# Patient Record
Sex: FEMALE | Race: WHITE | NOT HISPANIC OR LATINO | URBAN - METROPOLITAN AREA
[De-identification: names, ages, dates, MRNs, and addresses within clinical notes are randomized per-mention and may not be internally consistent; named-entity substitution may affect disease eponyms.]

---

## 2017-08-22 ENCOUNTER — APPOINTMENT (RX ONLY)
Dept: URBAN - METROPOLITAN AREA CLINIC 134 | Facility: CLINIC | Age: 32
Setting detail: DERMATOLOGY
End: 2017-08-22

## 2017-08-22 DIAGNOSIS — D49.2 NEOPLASM OF UNSPECIFIED BEHAVIOR OF BONE, SOFT TISSUE, AND SKIN: ICD-10-CM

## 2017-08-22 DIAGNOSIS — D22 MELANOCYTIC NEVI: ICD-10-CM

## 2017-08-22 DIAGNOSIS — L81.2 FRECKLES: ICD-10-CM

## 2017-08-22 DIAGNOSIS — Z71.89 OTHER SPECIFIED COUNSELING: ICD-10-CM

## 2017-08-22 PROBLEM — D22.5 MELANOCYTIC NEVI OF TRUNK: Status: ACTIVE | Noted: 2017-08-22

## 2017-08-22 PROBLEM — D22.72 MELANOCYTIC NEVI OF LEFT LOWER LIMB, INCLUDING HIP: Status: ACTIVE | Noted: 2017-08-22

## 2017-08-22 PROBLEM — D22.71 MELANOCYTIC NEVI OF RIGHT LOWER LIMB, INCLUDING HIP: Status: ACTIVE | Noted: 2017-08-22

## 2017-08-22 PROCEDURE — ? SHAVE REMOVAL

## 2017-08-22 PROCEDURE — 11301 SHAVE SKIN LESION 0.6-1.0 CM: CPT

## 2017-08-22 PROCEDURE — ? PUNCH EXCISION

## 2017-08-22 PROCEDURE — ? COUNSELING

## 2017-08-22 PROCEDURE — ? OBSERVATION

## 2017-08-22 PROCEDURE — 99213 OFFICE O/P EST LOW 20 MIN: CPT | Mod: 25

## 2017-08-22 PROCEDURE — 11400 EXC TR-EXT B9+MARG 0.5 CM<: CPT

## 2017-08-22 ASSESSMENT — LOCATION DETAILED DESCRIPTION DERM
LOCATION DETAILED: LEFT MID-UPPER BACK
LOCATION DETAILED: RIGHT SUPERIOR MEDIAL MIDBACK
LOCATION DETAILED: SUPERIOR LUMBAR SPINE
LOCATION DETAILED: LEFT DISTAL POSTERIOR THIGH
LOCATION DETAILED: RIGHT PROXIMAL PRETIBIAL REGION

## 2017-08-22 ASSESSMENT — LOCATION ZONE DERM
LOCATION ZONE: LEG
LOCATION ZONE: TRUNK

## 2017-08-22 ASSESSMENT — LOCATION SIMPLE DESCRIPTION DERM
LOCATION SIMPLE: RIGHT PRETIBIAL REGION
LOCATION SIMPLE: LEFT POSTERIOR THIGH
LOCATION SIMPLE: LOWER BACK
LOCATION SIMPLE: LEFT UPPER BACK
LOCATION SIMPLE: RIGHT LOWER BACK

## 2017-08-22 NOTE — PROCEDURE: OBSERVATION
Size Of Lesion In Cm (Optional): 0
Triangulation (Location Of Lesion In Relation To Distance From Anatomical Landmarks): See photo
Detail Level: Simple

## 2017-08-22 NOTE — PROCEDURE: PUNCH EXCISION
Include Undermining Statement In The Repair Note?: No
Render Post-Care Instructions In Note?: yes
Hemostasis: None
X Depth Of Punch In Cm (Optional): 0
Wound Dressings: a pressure dressing
3.5 Mm Punch Excision Text: A 3.5 mm punch biopsy was used to excise the lesion to the level of the subcutaneous fat.  Blunt dissection was used to free the lesion from the surrounding tissues and the lesion was removed.
1.5 Mm Punch Excision Text: A 1.5 mm punch biopsy was used to excise the lesion to the level of the subcutaneous fat.  Blunt dissection was used to free the lesion from the surrounding tissues and the lesion was removed.
Detail Level: Detailed
Deep Sutures: 4-0 Monocryl
Punch Size In Mm: 10
Suture Removal: 14 days
2.5 Mm Punch Excision Text: A 2.5 mm punch biopsy was used to excise the lesion to the level of the subcutaneous fat.  Blunt dissection was used to free the lesion from the surrounding tissues and the lesion was removed.
2 Mm Punch Excision Text: A 2 mm punch biopsy was used to excise the lesion to the level of the subcutaneous fat.  Blunt dissection was used to free the lesion from the surrounding tissues and the lesion was removed.
Body Location Override (Optional - Billing Will Still Be Based On Selected Body Map Location If Applicable): right shin
4.5 Mm Punch Excision Text: A 4.5 mm punch biopsy was used to excise the lesion to the level of the subcutaneous fat.  Blunt dissection was used to free the lesion from the surrounding tissues and the lesion was removed.
12 Mm Punch Excision Text: A 12 mm punch biopsy was used to excise the lesion to the level of the subcutaneous fat.  Blunt dissection was used to free the lesion from the surrounding tissues and the lesion was removed.
10 Mm Punch Excision Text: A 10 mm punch biopsy was used to excise the lesion to the level of the subcutaneous fat.  Blunt dissection was used to free the lesion from the surrounding tissues and the lesion was removed.
Billing Type: Third-Party Bill
7 Mm Punch Excision Text: A 7 mm punch biopsy was used to excise the lesion to the level of the subcutaneous fat.  Blunt dissection was used to free the lesion from the surrounding tissues and the lesion was removed.
Wound Care: Mupirocin
Lab Facility: [id number]
Anesthesia Type: 1% lidocaine with epinephrine
Consent was obtained from the patient. The risks and benefits to therapy were discussed in detail. Specifically, the risks of infection, scarring, bleeding, prolonged wound healing, incomplete removal, allergy to anesthesia, nerve injury and recurrence were addressed. All components of Universal Protocol/PAUSE Rule completed.
Size Of Lesion (*Required): 0.4
5 Mm Punch Excision Text: A 5 mm punch biopsy was used to excise the lesion to the level of the subcutaneous fat.  Blunt dissection was used to free the lesion from the surrounding tissues and the lesion was removed.
Anesthesia Volume In Cc: 1
Triangulation (Location Of Lesion In Relation To Distance From Anatomical Landmarks): See photo
8 Mm Punch Excision Text: A 8 mm punch biopsy was used to excise the lesion to the level of the subcutaneous fat.  Blunt dissection was used to free the lesion from the surrounding tissues and the lesion was removed.
3 Mm Punch Excision Text: A 3 mm punch biopsy was used to excise the lesion to the level of the subcutaneous fat.  Blunt dissection was used to free the lesion from the surrounding tissues and the lesion was removed.
Lab: [id number]
6 Mm Punch Excision Text: A 6 mm punch biopsy was used to excise the lesion to the level of the subcutaneous fat.  Blunt dissection was used to free the lesion from the surrounding tissues and the lesion was removed.
Epidermal Closure: simple interrupted
4 Mm Punch Excision Text: A 4 mm punch biopsy was used to excise the lesion to the level of the subcutaneous fat.  Blunt dissection was used to free the lesion from the surrounding tissues and the lesion was removed.

## 2017-08-22 NOTE — PROCEDURE: SHAVE REMOVAL
X Size Of Lesion In Cm (Optional): 0
Size Of Lesion In Cm (Required): 0.6
Lab Facility: [id number]
Path Notes (To The Dermatopathologist): Please check margins.
Medical Necessity Information: It is in your best interest to select a reason for this procedure from the list below. All of these items fulfill various CMS LCD requirements except the new and changing color options.
Consent was obtained from the patient. The risks and benefits to therapy were discussed in detail. Specifically, the risks of infection, scarring, bleeding, prolonged wound healing, incomplete removal, allergy to anesthesia, nerve injury and recurrence were addressed. Prior to the procedure, the treatment site was clearly identified and confirmed by the patient. All components of Universal Protocol/PAUSE Rule completed.
Billing Type: Third-Party Bill
Lab: [id number]
Hemostasis: Aluminum Chloride and Electrocautery
Biopsy Method: Dermablade
Bill For Surgical Tray: no
Medical Necessity Clause: This procedure was medically necessary because the lesion that was treated was: enlarging
Anesthesia Type: 1% lidocaine with epinephrine and a 1:10 solution of 8.4% sodium bicarbonate
Render Post-Care Instructions In Note?: yes
Anesthesia Volume In Cc: 0.4
Detail Level: Detailed
Wound Care: Petrolatum
Post-Care Instructions: I reviewed with the patient in detail post-care instructions. \\n• Keep the bandaid or pressure dressing dry and in place for 24 hours or as ordered.  Then wet the dressing in the shower or with a wet towel.  Peel it off gently.  After 24 hours, you may bathe normally.\\n• Gently clean the site once a day with soap and water and apply Polysporin antibiotic ointment or petroleum jelly (Vaseline)or Aquaphor to avoid scab formation.  \\n• Do not submerge the site under water in a pool, bathtub or hot tub for 7 days or until sutures are removed.\\n• For any discomfort, you may use a non-aspirin product for pain and discomfort – such as Tylenol or Tylenol Extra-Strength.  AVOID Aleve®, Advil®, Motrin®, ibuprofen and aspirin products for the first 24 hours as they increase bleeding.\\n• If the surgical wound is on the face or scalp, swelling, bruising, and throbbing may occur which can be minimized by sleeping with the head elevated.  Swelling will occur and can be decreased by keeping the surgical site elevated and applying a cold pack around the bandage for 10-15 minutes of every hour.\\n• Oozing a small amount of blood may be controlled by applying continuous pressure directly to the site with a clean gauze or washcloth for 15-20 minutes.  If bleeding does not stop after 15- 20 minutes of holding continuous pressure, repeat for an additional 20 minutes.  If the bleeding persists, then please contact our office at the number below.\\n• Watch for signs of infection:  increasing tenderness or redness, swelling, drainage of pus, opening of wound or temperature over 101.  Call our office if you think you may have an infection.  A normal healing site will have some light redness around the edges of the site, but bright red would indicate a possible infection.
Notification Instructions: You will be contacted in approximately 7 to 10 business days regarding your biopsy or excision results.  If you have not heard from our office within 2 weeks, please call our office and ask to speak with Dr. Borjas’s medical assistant, Rupali, at [phone], extension [phone].
Body Location Override (Optional - Billing Will Still Be Based On Selected Body Map Location If Applicable): left medial scapula